# Patient Record
Sex: FEMALE | Race: WHITE | NOT HISPANIC OR LATINO | Employment: FULL TIME | URBAN - METROPOLITAN AREA
[De-identification: names, ages, dates, MRNs, and addresses within clinical notes are randomized per-mention and may not be internally consistent; named-entity substitution may affect disease eponyms.]

---

## 2022-01-18 ENCOUNTER — OFFICE VISIT (OUTPATIENT)
Dept: OPTOMETRY | Facility: CLINIC | Age: 62
End: 2022-01-18
Payer: COMMERCIAL

## 2022-01-18 DIAGNOSIS — H52.4 PRESBYOPIA: ICD-10-CM

## 2022-01-18 DIAGNOSIS — H52.13 MYOPIA OF BOTH EYES: Primary | ICD-10-CM

## 2022-01-18 DIAGNOSIS — H04.129 DRY EYE: ICD-10-CM

## 2022-01-18 PROCEDURE — 92004 COMPRE OPH EXAM NEW PT 1/>: CPT | Performed by: OPTOMETRIST

## 2022-01-18 PROCEDURE — 92015 DETERMINE REFRACTIVE STATE: CPT | Performed by: OPTOMETRIST

## 2022-01-18 RX ORDER — CYCLOSPORINE 0.5 MG/ML
EMULSION OPHTHALMIC
COMMUNITY
Start: 2021-03-29

## 2022-01-18 RX ORDER — CYCLOBENZAPRINE HCL 5 MG
TABLET ORAL EVERY 24 HOURS
COMMUNITY

## 2022-01-18 RX ORDER — ALBUTEROL SULFATE 90 UG/1
AEROSOL, METERED RESPIRATORY (INHALATION)
COMMUNITY
Start: 2021-12-02

## 2022-01-18 RX ORDER — AZITHROMYCIN 500 MG/1
TABLET, FILM COATED ORAL
COMMUNITY
Start: 2021-12-07

## 2022-01-18 RX ORDER — IVERMECTIN 3 MG/1
TABLET ORAL
COMMUNITY
Start: 2021-12-07

## 2022-01-18 RX ORDER — ESCITALOPRAM OXALATE 10 MG/1
10 TABLET ORAL DAILY
COMMUNITY
Start: 2021-03-29

## 2022-01-18 RX ORDER — ORAL SEMAGLUTIDE 7 MG/1
TABLET ORAL
COMMUNITY
Start: 2022-01-13

## 2022-01-18 ASSESSMENT — REFRACTION_WEARINGRX
OS_CYLINDER: +0.50
SPECS_TYPE: PAL
OS_SPHERE: -3.00
OS_ADD: +2.50
OD_ADD: +2.50
OD_CYLINDER: +1.00
OS_AXIS: 155
OD_AXIS: 056
OD_SPHERE: -3.00

## 2022-01-18 ASSESSMENT — VISUAL ACUITY
OS_CC: 20/40
OD_CC+: -2
OS_CC+: -2
OS_SC: 20/70
OD_CC: 20/30-2
OD_CC: 20/25
OS_SC: 20/20-2
OS_CC: 20/25
CORRECTION_TYPE: GLASSES
OD_SC: 20/20
METHOD: SNELLEN - LINEAR
OD_SC: 20/70

## 2022-01-18 ASSESSMENT — CONF VISUAL FIELD
OD_NORMAL: 1
OS_NORMAL: 1
METHOD: COUNTING FINGERS

## 2022-01-18 ASSESSMENT — REFRACTION_MANIFEST
OS_AXIS: 085
OD_SPHERE: -2.25
OS_SPHERE: -2.00
OS_ADD: +2.75
OS_CYLINDER: +0.25
OD_AXIS: 050
OS_AXIS: 009
OD_CYLINDER: +0.25
OS_CYLINDER: +6.50
OD_SPHERE: -2.00
OS_SPHERE: -2.25
OD_AXIS: 027
OD_ADD: +2.75
METHOD_AUTOREFRACTION: 1
OD_CYLINDER: +0.25

## 2022-01-18 ASSESSMENT — SLIT LAMP EXAM - LIDS
COMMENTS: NORMAL
COMMENTS: NORMAL

## 2022-01-18 ASSESSMENT — TONOMETRY
OD_IOP_MMHG: 15
IOP_METHOD: APPLANATION
OS_IOP_MMHG: 15

## 2022-01-18 ASSESSMENT — KERATOMETRY
OS_K2POWER_DIOPTERS: 44
METHOD_AUTO_MANUAL: AUTOMATED
OS_AXISANGLE2_DEGREES: 179
OD_AXISANGLE2_DEGREES: 164
OD_AXISANGLE_DEGREES: 74
OD_K1POWER_DIOPTERS: 42.87
OS_K1POWER_DIOPTERS: 43
OS_AXISANGLE_DEGREES: 89
OD_K2POWER_DIOPTERS: 43.50

## 2022-01-18 ASSESSMENT — CUP TO DISC RATIO
OS_RATIO: 0.2
OD_RATIO: 0.2

## 2022-01-18 ASSESSMENT — EXTERNAL EXAM - RIGHT EYE: OD_EXAM: NORMAL

## 2022-01-18 ASSESSMENT — EXTERNAL EXAM - LEFT EYE: OS_EXAM: NORMAL

## 2022-01-18 NOTE — PROGRESS NOTES
"Chief Complaint   Patient presents with     Annual Eye Exam        Last Eye Exam: 1 year ago [facility]  Dilated Previously: Yes, side effects of dilation explained today  Declined had it done last year   What are you currently using to see?  glasses       Distance Vision Acuity: Satisfied with vision    Near Vision Acuity: Not satisfied - taking glasses off to read instead of using rading power    Eye Comfort: good  Do you use eye drops? : No was on Restasis and no longer uses, Lumify once monthly   Occupation or Hobbies: [employer]      Wd 25\" no prescription at that distance     Medical, surgical and family histories reviewed and updated 1/18/2022.       OBJECTIVE: See Ophthalmology exam    ASSESSMENT:    ICD-10-CM    1. Myopia of both eyes  H52.13    2. Presbyopia  H52.4    3. Dry eye  H04.129       No prescription needed for computer  PLAN:   Update progressive addition lens recommend dilation after new prescription   Artificial tears  As needed , lumify once daily as needed     Pily Angela OD   "

## 2022-01-18 NOTE — LETTER
"    1/18/2022         RE: Tisha Rodriguez  [id number] [place] [id number]        Dear Colleague,    Thank you for referring your patient, [facility]isha Rodriguez, [facility]. Please see a copy of my visit note below.    Chief Complaint   Patient presents with     Annual Eye Exam        Last Eye Exam: 1 year ago [facility]  Dilated Previously: Yes, side effects of dilation explained today  Declined had it done last year   What are you currently using to see?  glasses       Distance Vision Acuity: Satisfied with vision    Near Vision Acuity: Not satisfied - taking glasses off to read instead of using rading power    Eye Comfort: good  Do you use eye drops? : No was on Restasis and no longer uses, Lumify once monthly   Occupation or Hobbies: [employer]      Wd 25\" no prescription at that distance     Medical, surgical and family histories reviewed and updated 1/18/2022.       OBJECTIVE: See Ophthalmology exam    ASSESSMENT:    ICD-10-CM    1. Myopia of both eyes  H52.13    2. Presbyopia  H52.4    3. Dry eye  H04.129       No prescription needed for computer  PLAN:   Update progressive addition lens recommend dilation after new prescription   Artificial tears  As needed , lumify once daily as needed     Pily Angela OD       Again, thank you for allowing me to participate in the care of your patient.        Sincerely,        Pily Angela, OD    "